# Patient Record
Sex: FEMALE | Race: WHITE | NOT HISPANIC OR LATINO | URBAN - METROPOLITAN AREA
[De-identification: names, ages, dates, MRNs, and addresses within clinical notes are randomized per-mention and may not be internally consistent; named-entity substitution may affect disease eponyms.]

---

## 2017-04-14 ENCOUNTER — EMERGENCY (EMERGENCY)
Facility: HOSPITAL | Age: 27
LOS: 1 days | Discharge: HOME | End: 2017-04-14
Attending: EMERGENCY MEDICINE | Admitting: EMERGENCY MEDICINE
Payer: COMMERCIAL

## 2017-04-14 VITALS
HEIGHT: 65 IN | WEIGHT: 145.06 LBS | SYSTOLIC BLOOD PRESSURE: 111 MMHG | RESPIRATION RATE: 18 BRPM | OXYGEN SATURATION: 100 % | HEART RATE: 75 BPM | DIASTOLIC BLOOD PRESSURE: 74 MMHG | TEMPERATURE: 98 F

## 2017-04-14 DIAGNOSIS — R55 SYNCOPE AND COLLAPSE: ICD-10-CM
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
## 2017-04-14 DIAGNOSIS — F10.10 ALCOHOL ABUSE, UNCOMPLICATED: ICD-10-CM

## 2017-04-14 LAB
ALBUMIN SERPL ELPH-MCNC: 3.8 G/DL — SIGNIFICANT CHANGE UP (ref 3.4–5)
ALP SERPL-CCNC: 78 U/L — SIGNIFICANT CHANGE UP (ref 40–120)
ALT FLD-CCNC: 22 U/L — SIGNIFICANT CHANGE UP (ref 12–42)
ANION GAP SERPL CALC-SCNC: 9 MMOL/L — SIGNIFICANT CHANGE UP (ref 9–16)
AST SERPL-CCNC: 13 U/L — LOW (ref 15–37)
BASOPHILS NFR BLD AUTO: 0.4 % — SIGNIFICANT CHANGE UP (ref 0–2)
BILIRUB SERPL-MCNC: 0.2 MG/DL — SIGNIFICANT CHANGE UP (ref 0.2–1.2)
BUN SERPL-MCNC: 7 MG/DL — SIGNIFICANT CHANGE UP (ref 7–23)
CALCIUM SERPL-MCNC: 8.1 MG/DL — LOW (ref 8.5–10.5)
CHLORIDE SERPL-SCNC: 107 MMOL/L — SIGNIFICANT CHANGE UP (ref 96–108)
CO2 SERPL-SCNC: 24 MMOL/L — SIGNIFICANT CHANGE UP (ref 22–31)
CREAT SERPL-MCNC: 0.56 MG/DL — SIGNIFICANT CHANGE UP (ref 0.5–1.3)
EOSINOPHIL NFR BLD AUTO: 1.4 % — SIGNIFICANT CHANGE UP (ref 0–6)
ETHANOL SERPL-MCNC: 130 MG/DL — HIGH
GLUCOSE SERPL-MCNC: 94 MG/DL — SIGNIFICANT CHANGE UP (ref 70–99)
HCT VFR BLD CALC: 34.9 % — SIGNIFICANT CHANGE UP (ref 34.5–45)
HGB BLD-MCNC: 12.3 G/DL — SIGNIFICANT CHANGE UP (ref 11.5–15.5)
LYMPHOCYTES # BLD AUTO: 30.8 % — SIGNIFICANT CHANGE UP (ref 13–44)
MCHC RBC-ENTMCNC: 31.1 PG — SIGNIFICANT CHANGE UP (ref 27–34)
MCHC RBC-ENTMCNC: 35.2 G/DL — SIGNIFICANT CHANGE UP (ref 32–36)
MCV RBC AUTO: 88.4 FL — SIGNIFICANT CHANGE UP (ref 80–100)
MONOCYTES NFR BLD AUTO: 9.4 % — SIGNIFICANT CHANGE UP (ref 2–14)
NEUTROPHILS NFR BLD AUTO: 58 % — SIGNIFICANT CHANGE UP (ref 43–77)
PLATELET # BLD AUTO: 270 K/UL — SIGNIFICANT CHANGE UP (ref 150–400)
POTASSIUM SERPL-MCNC: 3.4 MMOL/L — LOW (ref 3.5–5.3)
POTASSIUM SERPL-SCNC: 3.4 MMOL/L — LOW (ref 3.5–5.3)
PROT SERPL-MCNC: 6.9 G/DL — SIGNIFICANT CHANGE UP (ref 6.4–8.2)
RBC # BLD: 3.95 M/UL — SIGNIFICANT CHANGE UP (ref 3.8–5.2)
RBC # FLD: 12.8 % — SIGNIFICANT CHANGE UP (ref 10.3–16.9)
SODIUM SERPL-SCNC: 140 MMOL/L — SIGNIFICANT CHANGE UP (ref 135–145)
WBC # BLD: 5.6 K/UL — SIGNIFICANT CHANGE UP (ref 3.8–10.5)
WBC # FLD AUTO: 5.6 K/UL — SIGNIFICANT CHANGE UP (ref 3.8–10.5)

## 2017-04-14 PROCEDURE — 99284 EMERGENCY DEPT VISIT MOD MDM: CPT | Mod: 25

## 2017-04-14 PROCEDURE — 93005 ELECTROCARDIOGRAM TRACING: CPT

## 2017-04-14 PROCEDURE — 80053 COMPREHEN METABOLIC PANEL: CPT

## 2017-04-14 PROCEDURE — 80307 DRUG TEST PRSMV CHEM ANLYZR: CPT

## 2017-04-14 PROCEDURE — 36415 COLL VENOUS BLD VENIPUNCTURE: CPT

## 2017-04-14 PROCEDURE — 85025 COMPLETE CBC W/AUTO DIFF WBC: CPT

## 2017-04-14 RX ORDER — SODIUM CHLORIDE 9 MG/ML
1000 INJECTION INTRAMUSCULAR; INTRAVENOUS; SUBCUTANEOUS ONCE
Qty: 0 | Refills: 0 | Status: COMPLETED | OUTPATIENT
Start: 2017-04-14 | End: 2017-04-14

## 2017-04-14 RX ADMIN — SODIUM CHLORIDE 2000 MILLILITER(S): 9 INJECTION INTRAMUSCULAR; INTRAVENOUS; SUBCUTANEOUS at 23:32

## 2017-04-14 NOTE — ED ADULT NURSE NOTE - OBJECTIVE STATEMENT
Pt BIBA s/p SYNCOPE x 'minutes' after drinking ~ 6 drinks in bar since 0500 hrs with friends; pt arrived to [facility] ED euglycemic, VSS, A&Ox3; no gross evidence trauma. Same stated previous syncopal episodes in past (~ 5 or 6 events since age 12) but feels that today's episode was likely related to her drinking ETOH.

## 2017-04-14 NOTE — ED ADULT TRIAGE NOTE - CHIEF COMPLAINT QUOTE
Patient complaining of syncope related to alcohol intoxication. No obvious signs of trauma noted. Denies neck of back pain.

## 2017-04-15 VITALS
OXYGEN SATURATION: 98 % | HEART RATE: 78 BPM | DIASTOLIC BLOOD PRESSURE: 86 MMHG | TEMPERATURE: 98 F | RESPIRATION RATE: 18 BRPM | SYSTOLIC BLOOD PRESSURE: 160 MMHG

## 2017-04-15 NOTE — ED PROVIDER NOTE - OBJECTIVE STATEMENT
25 y/o female c/o syncope. pt states 6 drinks tonight and syncope while sitting at bar with friends. pt denies ha, neck or back pain. no visual changes. no cp, sob, abd pain, n/v. pt reports hx of syncope in past but never occurred while drinking. pt denies drug use. pt currently feeling well.

## 2017-04-15 NOTE — ED PROVIDER NOTE - ATTENDING CONTRIBUTION TO CARE
26 yof bibems for syncope.  pt admits to 6 drinks tonight and passed out at the bar.  denies cp/sob/ha/neck pain/nv.  currently feels no sx.  no pleurisy, no hx of PE or CAD.    agree w/ PA, avss, ao x 3, rrr, cta b/l, moving all extremities, will check screening ekg, low suspicion for ACS or PE (no hypoxia, no tachypnea, no tachycardia)

## 2017-04-15 NOTE — ED PROVIDER NOTE - MEDICAL DECISION MAKING DETAILS
syncope and etoh use. pt well appearing. neuro exam intact. vss. ecg no acute changes. labs noted. pt with steady gait in ED. will d/c home to f/u with pmd

## 2019-12-05 NOTE — ED ADULT NURSE NOTE - HIV INFORMATION PROVIDED
PLEASE FOLLOW UP WITH YOUR PRIMARY CARE PHYSICIAN IN REGARDS TO THIS URGENT CARE VISIT    TREATMENT PLAN FOR TODAY'S VISIT:        1.  ORAL ANTIBIOTICS  Augmentin 1 tablet twice daily x 7 days  To prevent antibiotic induced diarrhea (or an yeast infection for women), take OTC acidophillus or lactobacillus 1 capsule twice daily x 14 days.  Alternatively, you can take Activia yogurt (or other yogurt that contains probiotics) twice daily x 14 days (do not take simultaneously with antibiotics)    2.  Follow up with your primary care physician in 4-5, as long as there is improvement in the wound.     3.  Tetanus vaccination is typically good for 10 years.  But this may change depending on the next trauma/infection.  The physician at that time will make that decision.     4.  MONITOR FOR SIGNS OF WORSENING CELLULITIS (INCREASING SWELLING/REDNESS/PAIN, FEVER)    5.  FOR FEVER/PAIN MANAGEMENT, ALTERNATE ACETAMINOPHEN (FOR PAIN) AND IBUPROFEN (FOR PAIN AND INFLAMMATION).    ACETAMINOPHEN 500 MG 1-2 TABLETS EVERY 6 HOURS AS NEEDED (MAX. 3000 MG/DAILY)  IBUPROFEN 200 MG 2-3 TABLETS EVERY 6 HOURS AS NEEDED (MAX. 2400 MG/DAILY)     6.  Place an ice pack over the affected area, to reduce the swelling and numb the pain.  You can place, for example, a zip lock bag full of ice, place a towel or plastic bag in between the Ziplock and the wound/skin, so that the condensation does not cause the bandage to become wet.  • Ice should be kept on for only 15 minutes at a time        7.  No warm or hot showers, which can make your itching worse.  Try to stick with cold showers and cold compresses over the affected areas.  Ice packs placed on the affected area will help with the swelling, redness and itching.     8.  YOU CAN USE ORAL BENADRYL FOR SWELLING, REDNESS OR ITCHING AS NEEDED.   BENADRYL (OTC oral)   Children 2 to <6 years: 6.25 mg every 4-6 hours; maximum: 37.5 mg/day   Children 6 to <12 years: 12.5-25 mg every 4-6 hours; maximum:  150 mg/day   Children ?12 years and Adults: 25-50 mg every 4-6 hours; maximum: 300 mg/day     9.  Keep the affected area as dry as possible.  Safer soaps (Ivory, Aveeno, Dove) and water to cleanse the wound.  Afterwards, dry the area thoroughly.    Safer skin lotions include Aveeno, Cetaphil, Lubriderm or Eucerin (no preservatives)       Cellulitis    What is cellulitis?   Cellulitis is an infection of the skin and underlying tissue caused by streptococcal, staphylococcal, or other bacteria. This infection is serious and should receive immediate medical attention. Without treatment the infection can damage skin tissues and spread quickly through the bloodstream to the entire body. It could become life threatening.  Cellulitis is usually worse for if you have a lowered resistance to infection because of an illness or disorder such as HIV/AIDS, diabetes, or a weak immune system.  How does it occur?   Cellulitis can be caused by different types of bacteria. Bacteria enter the body through a cut or sore. Poisons made by the bacteria destroy skin cells. The infection spreads over the area within a day or two and can affect tissues below the skin.  A particularly dangerous type of cellulitis can affect the eyes. It is called orbital cellulitis and can happen when bacteria get into tissues around the eye socket. Infected tissue swells around the eye, causing it to bulge out. This can trap and damage nerves. The infection can spread into the bloodstream or into the brain and cause life-threatening problems.  If you have health problems, such as poor circulation or edema (swelling), it is easier to get cellulitis.  What are the symptoms?   Cellulitis most often occurs on the face, arms, or legs, but it can happen anywhere. Symptoms of cellulitis may include:  redness   swelling   extreme tenderness or pain   skin that feels hot to the touch   red streaks from the wound or sore   pus-filled sores (abscesses)   swollen and  tender lymph glands   fever.   The symptoms of orbital cellulitis include:  problems with your vision   pain in and around the eye   discharge from the eye   fever   swelling and redness of the eyelids and tissue around the eye   an eye that looks like it bulges forward compared to the other eye.   How is it diagnosed?   Your healthcare provider will examine you. You may have tests of your blood and discharge from sores.  How is it treated?   The infection is treated with an antibiotic. If you are taking an antibiotic by mouth, your provider will probably want to see you or talk to you 1 or 2 days after your first visit to make sure the antibiotic is working. If the infection does not get better with oral antibiotics, you may need to be treated at the hospital with intravenous (IV) antibiotics.  If you have a sinus infection that is causing orbital cellulitis, you may need surgery to drain the infection from your eye socket and sinuses.  How long will the effects of cellulitis last?   If treated right away with antibiotics, the infection usually clears up within 1 or 2 weeks.  Cellulitis that is not properly treated may lead to:  infection in the blood   gangrene (areas of body tissue destroyed) and possibly loss of a body part (amputation)   meningitis if the infection spreads to the brain   death.   Orbital cellulitis can cause a loss of vision.  How can I take care of myself?   If you were prescribed an antibiotic, take all of it exactly as prescribed.   Ask your healthcare provider how to care for the infected area. For example, ask if you should put hot packs or dressings on the area.   Sometimes the infection may get worse even though you are taking an antibiotic. Ask your provider what symptoms you should watch for and when you should check back with your provider.   Call your provider right away if:   Your symptoms are getting worse.   You have new symptoms.   Call your provider during office hours if:    Your symptoms are not starting to get better in 2 to 3 days.   How can I help prevent cellulitis?   Clean cuts, scrapes, and other skin injuries well with soap and water.   Keep wounds and sores clean and covered with a bandage. Change the bandage daily, or sooner if it gets dirty or wet.   See your healthcare provider for treatment as soon as possible if a wound or sore shows signs of infection.   If you have diabetes, follow your instructions for good skin care and keep your blood sugar under good control.          Yes

## 2024-08-15 NOTE — ED ADULT NURSE NOTE - PAIN RATING/NUMBER SCALE (0-10): ACTIVITY
[Well Developed] : well developed [Well Nourished] : well nourished [No Acute Distress] : no acute distress [Normal Conjunctiva] : normal conjunctiva [Normal Venous Pressure] : normal venous pressure [No Carotid Bruit] : no carotid bruit [Normal S1, S2] : normal S1, S2 [No Murmur] : no murmur [No Rub] : no rub [No Gallop] : no gallop [Clear Lung Fields] : clear lung fields [Good Air Entry] : good air entry [No Respiratory Distress] : no respiratory distress  [Soft] : abdomen soft [Non Tender] : non-tender [No Masses/organomegaly] : no masses/organomegaly 0 [Normal Bowel Sounds] : normal bowel sounds [Normal Gait] : normal gait [No Cyanosis] : no cyanosis [No Clubbing] : no clubbing [No Varicosities] : no varicosities [Normal PT B/L] : normal PT B/L [Edema ___] : edema [unfilled] [No Rash] : no rash [No Skin Lesions] : no skin lesions [Moves all extremities] : moves all extremities [No Focal Deficits] : no focal deficits [Normal Speech] : normal speech [Alert and Oriented] : alert and oriented [Normal memory] : normal memory